# Patient Record
Sex: FEMALE | Race: WHITE | NOT HISPANIC OR LATINO
[De-identification: names, ages, dates, MRNs, and addresses within clinical notes are randomized per-mention and may not be internally consistent; named-entity substitution may affect disease eponyms.]

---

## 2016-12-30 NOTE — H&P ADULT. - ASSESSMENT
Pt is a 80yo F with PMH of alopecia, htn, hLD, migraines, osteoporosis, neuropathy, DVT on xarelto who was BIBEMS for altered mental status, found to have delirium 2/2 sepsis vs iatrogenic encephalopathy vs CVA.

## 2016-12-30 NOTE — H&P ADULT. - PSH
Diverticulosis of sigmoid colon  resection 2004  H/O tonsillitis  tonsillectomy as a child  H/O ventral hernia repair

## 2016-12-30 NOTE — ED PROVIDER NOTE - PMH
Alopecia    Diverticulosis    HTN (hypertension)    Hyperlipidemia    Idiopathic peripheral neuropathy    Migraine    Osteoporosis

## 2016-12-30 NOTE — H&P ADULT. - PROBLEM SELECTOR PLAN 1
- CTH did not find evidence of acute intracranial infarct or hemorrhage  - pt likely has delirium 2/2 sepsis  - monitor VS q4h  - c/w IVNS at 125cc/hr x 10 hours  - no evidence of UTI based on UA, pt has not had recent episodes of diarrhea/vomiting other GI complaints to suspect abdominal source  - pulmonary sxs of ______ and CXR findings suspicious for PNA thus will cover for CAP   - c/w IV ceftriaxone and azithromycin  - trend CBC - CTH did not find evidence of acute intracranial infarct or hemorrhage  - monitor VS q4h  - c/w IVNS at 125cc/hr x 10 hours  - no evidence of UTI based on UA  - pt denies pulmonary sxs however CXR findings suspicious for PNA thus will cover for CAP   - c/w IV ceftriaxone and azithromycin  - trend CBC

## 2016-12-30 NOTE — H&P ADULT. - RADIOLOGY RESULTS AND INTERPRETATION
CT brain stroke protocol- no acute intracranial hemorrhage, mass effect or midline shift. Moderate to severe microvascular disease noted.     CT A/P with oral/IV contrast found atelectasis. No acute abdominal or pelvic pathology was noted     Xray hip 2 view preliminary read found no urgent/emergent findings. Final report pending.  xray L shoulder 1 view preliminary read found no acute fracture. Final report pending.  CXR preliminary read found L mid lung opacity compatible with atelectasis vs infection. Official read pending.

## 2016-12-30 NOTE — ED ADULT NURSE NOTE - OBJECTIVE STATEMENT
Alert and oriented x 1. pt is disoriented to time and situation. As per daughter in law pt was found laying in vomit and urine. Pt seems confused to her , weak and unlike herself. Pt denies chest pain, shortness of breath, nausea or dizziness Pt states she has abdominal pain 6/10, non radiating. IV 20 g to left AC. Labs drawn. VSS. Pt usually ambulates at home.  Pt is sinus rhythm on cardiac monitor. Will continue to monitor. RN Break Coverage.

## 2016-12-30 NOTE — H&P ADULT. - PROBLEM SELECTOR PLAN 2
- check vitamin D, PTH levels - continue therapy for sepsis  - pt has been on chronic vicodin, serum tox negative, will check Utox  - per neuro, will check EEG in AM, MRI/MRA brain if pt's neuro status declines

## 2016-12-30 NOTE — STROKE CODE NOTE - NIH STROKE SCALE: 1A. LEVEL OF CONSCIOUSNESS, QM
(0) Alert; keenly responsive (1) Not alert; but arousable by minor stimulation to obey, answer, or respond

## 2016-12-30 NOTE — ED PROVIDER NOTE - OBJECTIVE STATEMENT
80 yo F with history of diverticulosis, htn, hyperlipidemia, migraine presenting with AMS. Last known normal 8pm yesterday. Daughter in law found mother on bed laying on  side in her own urine and vomit. PtAAOx2. Pt cant recall events. Pt fell two days ago. Pt is on xarelto for dvt.

## 2016-12-30 NOTE — H&P ADULT. - LAB RESULTS AND INTERPRETATION
CBC notable for leukocytosis to 24.33 with neutrophil predominance. Coags abnormal with INR elevated  1.62, expected since pt on xarelto. CMP shows elevated from serum creatinine at 1.25. Total calcium elevated at 11.2. Troponin negative x1. Lactate elevated at 6.  UA positive for blood, protein.  Serum tox negative.

## 2016-12-30 NOTE — H&P ADULT. - MENTAL STATUS
A&O x 2 (name, year) , not place  CN II-XII intact, 5/5 BUE and 5/5 BLE strength, full sensation to light touch  word finding difficulty, can respond to yes and no questions but cannot respond to complex questions

## 2016-12-30 NOTE — ED ADULT TRIAGE NOTE - CHIEF COMPLAINT QUOTE
pt brought from home by EMS s/p call from daughter, as per daughter pt episode of aphasia associated w/ AMS, and emesis approx 1 hour ago, EMS received call 2:40pm, on presentation pt unsure about situation, alert to place and self, daughter also states fall 2 days ago on xarelto, no ecchymosis or hematoma noted on presentation, MD Duran called to triage evelyn mason called pt taken directly to CT

## 2016-12-30 NOTE — ED PROVIDER NOTE - ATTENDING CONTRIBUTION TO CARE
82 yo F with history of htn, hyperlipidemia, migraine presenting with AMS. Last known normal 8pm yesterday. Daughter in law found mother on bed laying on  side in her own urine and vomit. PtAAOx2. Pt cant recall events. Pt fell two days ago. Pt is on xarelto for dvt afvss mod dsitress altered confused rrr cta abd sfot no LE swelling//will ct head and do sepsis and ams work up

## 2016-12-31 NOTE — PHYSICAL THERAPY INITIAL EVALUATION ADULT - RANGE OF MOTION EXAMINATION, REHAB EVAL
bilateral upper extremity ROM was WFL (within functional limits)/bilateral lower extremity ROM was WFL (within functional limits)

## 2016-12-31 NOTE — PHYSICAL THERAPY INITIAL EVALUATION ADULT - PERTINENT HX OF CURRENT PROBLEM, REHAB EVAL
Pt is a 80yo F with PMH of alopecia, htn, hLD, migraines, osteoporosis, neuropathy, DVT on xarelto who was BIBEMS for altered mental status. At baseline, pt ambulates with cane, A&O x3, completes ADLs independently.

## 2017-01-03 NOTE — EEG REPORT - NS EEG TEXT BOX
Date: 1-2 to 1-3-17     Indication: Concern for seizure     FINDINGS:   Awake Background:  -The background was continuous, spontaneously variable, and reactive. During wakefulness, the posteriorly dominant alpha rhythm consisted of symmetric, well modulated 7.5 -8 Hz activity, with an amplitude to 30 uV, that attenuated to eye opening. Low amplitude central beta was noted intermittently.    Background Slowing:  -Generalized irregular low amplitude theta activity was present.      Sleep Background:  -Drowsiness was characterized by fragmentation, attenuation, and slowing of the background activity.   Intermittent increased centro-temporal theta was noted.  -Sleep was characterized by the presence of vertex waves, symmetric spindles, and K-complexes.    Interictal Epileptiform Activity:   -No epileptiform discharges were present.    Events:  -No clinical events or seizures were recorded during this study.    Activation Procedures:   -No activation procedures were performed during this study.    Artifacts:  Intermittent myogenic and movement artifact noted.     Heart rate:  Regular predominantly between   BPM.    Daily Compressed Spectral Array Digital Analysis    FINDINGS: Compressed Spectral Array (CSA) data was reviewed and correlated with the electroencephalographic findings detailed above. CSA showed a variable spectral pattern. Areas of increased power in particular were reviewed in detail, and compared with the raw EEG data. Areas of abrupt increases in spectral power were reviewed to exclude seizures, and were determined to be artifactual in nature.     The relative ratio of the power of delta range frequencies and faster frequencies remained stable over the course of the study. There was no definitive increase in the relative power in the delta frequency spectrum apparent in the left hemisphere versus the right hemisphere.     Compressed Spectral Array (Digital Analysis) Summary/ Impression:  No persistent hemispheric asymmetry. Intermittent areas of increased power reviewed, without definite epileptiform activity associated on CSA.     EEG Summary/Classification:  -Abnormal prolonged video EEG due to:  Mild generalized slowing     EEG Impression/Clinical Correlate:  -Findings indicate mild diffuse or multifocal cerebral dysfunction.  No seizures were recorded.

## 2017-01-03 NOTE — PROVIDER CONTACT NOTE (OTHER) - ASSESSMENT
pt in bed, alert, in no acute distress
All other vitals stable. Pt denies pain. Pt has hx HTN.
bp 172/89

## 2017-01-05 ENCOUNTER — TRANSCRIPTION ENCOUNTER (OUTPATIENT)
Age: 82
End: 2017-01-05

## 2017-01-05 NOTE — DISCHARGE NOTE ADULT - CARE PLAN
Principal Discharge DX:	Pneumonia  Goal:	continue abx  Instructions for follow-up, activity and diet:	Complete antibiotic, Ceftin 500mg po 2x a day. Complete course. Follow up with your PCP for further evaluation and management. Please call to make an appointment.  Secondary Diagnosis:	Idiopathic peripheral neuropathy  Instructions for follow-up, activity and diet:	Follow up with your PCP and pain management doctor for further evaluation and management.  Secondary Diagnosis:	HTN (hypertension)  Instructions for follow-up, activity and diet:	Stop your combo BP medication from home. Start the new medication, just losartan. Follow up with your PCP for further evaluation and management. Please call to make an appointment.  Secondary Diagnosis:	DVT (deep venous thrombosis)  Instructions for follow-up, activity and diet:	continue Xarelto. Follow up with your PCP for further evaluation and management. Please call to make an appointment.  Secondary Diagnosis:	Hypercalcemia  Instructions for follow-up, activity and diet:	Follow up with Dr. Garcia for outpatient follow up.  Secondary Diagnosis:	Hyperlipidemia  Instructions for follow-up, activity and diet:	Continue medications. Follow up with your PCP for further evaluation and management. Please call to make an appointment. Principal Discharge DX:	Pneumonia  Goal:	continue abx  Instructions for follow-up, activity and diet:	Complete antibiotic, Ceftin 500mg po 2x a day. Complete course. Follow up with your PCP for further evaluation and management. Please call to make an appointment.  Secondary Diagnosis:	Idiopathic peripheral neuropathy  Instructions for follow-up, activity and diet:	Follow up with your PCP and pain management doctor for further evaluation and management.  Secondary Diagnosis:	HTN (hypertension)  Instructions for follow-up, activity and diet:	Stop your combo BP medication from home. Start the new medication, just losartan. Follow up with your PCP for further evaluation and management. Please call to make an appointment.  Secondary Diagnosis:	DVT (deep venous thrombosis)  Instructions for follow-up, activity and diet:	continue Xarelto. Follow up with your PCP for further evaluation and management. Please call to make an appointment.  Secondary Diagnosis:	Hypercalcemia  Instructions for follow-up, activity and diet:	Follow up with Dr. Garcia for outpatient follow up.  Secondary Diagnosis:	Hyperlipidemia  Instructions for follow-up, activity and diet:	Continue medications. Follow up with your PCP for further evaluation and management. Please call to make an appointment.  Secondary Diagnosis:	Metabolic encephalopathy  Instructions for follow-up, activity and diet:	EEG was done; no seizure like activity noted. Follow up with Dr. Kunz in 1 week for further evaluation and management. PLease call to make an appointment. Principal Discharge DX:	Pneumonia  Goal:	continue abx  Instructions for follow-up, activity and diet:	Complete antibiotic, Ceftin 500mg po 2x a day. Complete course. Follow up with your PCP for further evaluation and management. Please call to make an appointment.  Secondary Diagnosis:	Idiopathic peripheral neuropathy  Instructions for follow-up, activity and diet:	Follow up with your PCP and pain management doctor for further evaluation and management.  Secondary Diagnosis:	HTN (hypertension)  Instructions for follow-up, activity and diet:	Stop your combo BP medication from home. Start the new medication, just losartan. Follow up with your PCP for further evaluation and management. Please call to make an appointment.  Secondary Diagnosis:	DVT (deep venous thrombosis)  Instructions for follow-up, activity and diet:	continue Xarelto. Follow up with your PCP for further evaluation and management. Please call to make an appointment.  Secondary Diagnosis:	Hypercalcemia  Instructions for follow-up, activity and diet:	Follow up with Dr. Garcia for outpatient follow up.  Secondary Diagnosis:	Hyperlipidemia  Instructions for follow-up, activity and diet:	Continue medications. Follow up with your PCP for further evaluation and management. Please call to make an appointment.  Secondary Diagnosis:	Metabolic encephalopathy  Instructions for follow-up, activity and diet:	EEG was done; no seizure like activity noted. Follow up with Dr. Kuzn in 1 week for further evaluation and management. PLease call to make an appointment.

## 2017-01-05 NOTE — DISCHARGE NOTE ADULT - SECONDARY DIAGNOSIS.
Idiopathic peripheral neuropathy HTN (hypertension) DVT (deep venous thrombosis) Hypercalcemia Hyperlipidemia Metabolic encephalopathy

## 2017-01-05 NOTE — DISCHARGE NOTE ADULT - PLAN OF CARE
continue abx Complete antibiotic, Ceftin 500mg po 2x a day. Complete course. Follow up with your PCP for further evaluation and management. Please call to make an appointment. Follow up with your PCP and pain management doctor for further evaluation and management. Stop your combo BP medication from home. Start the new medication, just losartan. Follow up with your PCP for further evaluation and management. Please call to make an appointment. continue Xarelto. Follow up with your PCP for further evaluation and management. Please call to make an appointment. Follow up with Dr. Garcia for outpatient follow up. Continue medications. Follow up with your PCP for further evaluation and management. Please call to make an appointment. EEG was done; no seizure like activity noted. Follow up with Dr. Kunz in 1 week for further evaluation and management. PLease call to make an appointment.

## 2017-01-05 NOTE — DISCHARGE NOTE ADULT - PATIENT PORTAL LINK FT
“You can access the [facility] Patient Portal, offered by [facility], by registering with the following website: [email or web address]”

## 2017-01-05 NOTE — DISCHARGE NOTE ADULT - PROVIDER TOKENS
TOKEN:'[id number]:'[id number]' TOKEN:'[id number]:'[id number]:'[id number]'

## 2017-01-05 NOTE — DISCHARGE NOTE ADULT - CARE PROVIDERS DIRECT ADDRESSES
,[email or web address],DirectAddress_Unknown,DirectAddress_Unknown [email or web address],DirectAddress_[email or web address],DirectAddress_Unknown

## 2017-01-05 NOTE — DISCHARGE NOTE ADULT - MEDICATION SUMMARY - MEDICATIONS TO TAKE
I will START or STAY ON the medications listed below when I get home from the hospital:    Imitrex 100 mg oral tablet  -- 1 tab(s) by mouth once, As Needed  -- Indication: For Headache    acetaminophen 325 mg oral tablet  -- 2 tab(s) by mouth every 6 hours, As needed, Mild Pain (1 - 3)  -- Indication: For Pain    losartan 25 mg oral tablet  -- 1 tab(s) by mouth once a day  -- Indication: For HTN (hypertension)    rivaroxaban 20 mg oral tablet  -- 1 tab(s) by mouth once a day  -- Indication: For DVT (deep venous thrombosis)    DULoxetine 60 mg oral delayed release capsule  -- 1 cap(s) by mouth 2 times a day  -- Indication: For Depression    simvastatin 20 mg oral tablet  -- 1 tab(s) by mouth once a day (at bedtime)  -- Indication: For Hyperlipidemia    Ceftin 500 mg oral tablet  -- 1 tab(s) by mouth 2 times a day  -- Finish all this medication unless otherwise directed by prescriber.  Medication should be taken with plenty of water.  Take with food or milk.    -- Indication: For Pneumonia    senna oral tablet  -- 2 tab(s) by mouth once a day (at bedtime), As needed, Constipation  -- Indication: For Need for prophylactic measure    lactobacillus acidophilus oral capsule  -- 1 cap(s) by mouth 2 times a day (with meals)  -- Indication: For Need for prophylactic measure I will START or STAY ON the medications listed below when I get home from the hospital:    Imitrex 100 mg oral tablet  -- 1 tab(s) by mouth once, As Needed  -- Indication: For Headache    acetaminophen 325 mg oral tablet  -- 2 tab(s) by mouth every 6 hours, As needed, Mild Pain (1 - 3)  -- Indication: For Pain    losartan 25 mg oral tablet  -- 1 tab(s) by mouth once a day  -- Indication: For  blood pressure    rivaroxaban 20 mg oral tablet  -- 1 tab(s) by mouth once a day  -- Indication: For DVT (deep venous thrombosis)    DULoxetine 60 mg oral delayed release capsule  -- 1 cap(s) by mouth 2 times a day  -- Indication: For Depression    simvastatin 20 mg oral tablet  -- 1 tab(s) by mouth once a day (at bedtime)  -- Indication: For Hyperlipidemia    Ceftin 500 mg oral tablet  -- 1 tab(s) by mouth 2 times a day  -- Finish all this medication unless otherwise directed by prescriber.  Medication should be taken with plenty of water.  Take with food or milk.    -- Indication: For Pneumonia    senna oral tablet  -- 2 tab(s) by mouth once a day (at bedtime), As needed, Constipation  -- Indication: For constipation    lactobacillus acidophilus oral capsule  -- 1 cap(s) by mouth 2 times a day (with meals)  -- Indication: For Need for prophylactic measure    pantoprazole 40 mg oral delayed release tablet  -- 1 tab(s) by mouth once a day  -- It is very important that you take or use this exactly as directed.  Do not skip doses or discontinue unless directed by your doctor.  Obtain medical advice before taking any non-prescription drugs as some may affect the action of this medication.  Swallow whole.  Do not crush.    -- Indication: For Need for prophylactic measure

## 2017-01-05 NOTE — DISCHARGE NOTE ADULT - HOSPITAL COURSE
Pt is a 80yo F with PMH of alopecia, htn, hLD, migraines, osteoporosis, neuropathy, DVT on xarelto who was BIBEMS for altered mental status, found to have delirium 2/2 sepsis vs iatrogenic encephalopathy vs CVA    .Strep pneumo bacteremia - ID consulted ([place]) - rocephin switched to Ceftin po for discharge. Course to be completed 1/14/17      Metabolic encephalopathy  - CT head neg   - continue therapy for sepsis  - pt has been on chronic vicodin, tox positive for opioids  - per neuro, EEG- -Abnormal prolonged video EEG due to: Mild generalized slowing   -Findings indicate mild diffuse or multifocal cerebral dysfunction.  No seizures were recorded.     DVT  - continue with xarelto at home dose.     Hyperparathyroidism  - Endo following (Garcia)  - outpt scan      HTN - hold losartan-hctz due to MYLES ; losartan resumed after MYLES improved. HCTZ remains held    Hyperlipidemia.- c/w simvastatin.    Patient to follow up with PCP and Endo as outpatient Physical therapy recommended home with home PT services Pt is a 82yo F with PMH of alopecia, htn, hLD, migraines, osteoporosis, neuropathy, DVT on xarelto who was BIBEMS for altered mental status, found to have delirium 2/2 sepsis vs iatrogenic encephalopathy vs CVA    .Strep pneumo bacteremia - ID consulted ([place]) - rocephin switched to Ceftin po for discharge. Course to be completed 1/14/17      Metabolic encephalopathy  - CT head neg   - continue therapy for sepsis  - pt has been on chronic vicodin, tox positive for opioids  - per neuro, EEG- -Abnormal prolonged video EEG due to: Mild generalized slowing   -Findings indicate mild diffuse or multifocal cerebral dysfunction.  No seizures were recorded. patient to follow up with Dr. Kunz in 1 week of discharge.     DVT  - continue with xarelto at home dose.     Hyperparathyroidism  - Endo following (Garcia)  - outpt scan      HTN - hold losartan-hctz due to MYLES ; losartan resumed after MYLES improved. HCTZ remains held    Hyperlipidemia.- c/w simvastatin.    Patient to follow up with PCP and Endo as outpatient Physical therapy recommended home with home PT services

## 2017-01-05 NOTE — DISCHARGE NOTE ADULT - MEDICATION SUMMARY - MEDICATIONS TO STOP TAKING
I will STOP taking the medications listed below when I get home from the hospital:    Vicodin 5 mg-300 mg oral tablet  -- 1 tab(s) by mouth every 8 hours    losartan-hydroCHLOROthiazide 50mg-12.5mg oral tablet  -- 1 tab(s) by mouth once a day

## 2018-01-17 NOTE — H&P ADULT. - REASON FOR ADMISSION
Message  Discussed with patient about side effects of Neurontin, patient is agreeable and patient would speak to family physician prior to taking the medication      Plan  Lumbar radiculopathy    · Gabapentin 100 MG Oral Capsule (Neurontin); TAKE 1 CAPSULE AT BEDTIME    Signatures   Electronically signed by : Monica Cortes MD; Oct  4 2016  3:49PM EST                       (Author) altered mental status

## 2018-10-18 ENCOUNTER — APPOINTMENT (OUTPATIENT)
Dept: FAMILY MEDICINE | Facility: CLINIC | Age: 83
End: 2018-10-18
Payer: MEDICARE

## 2018-10-18 ENCOUNTER — NON-APPOINTMENT (OUTPATIENT)
Age: 83
End: 2018-10-18

## 2018-10-18 VITALS
HEART RATE: 81 BPM | HEIGHT: 62 IN | BODY MASS INDEX: 37.91 KG/M2 | DIASTOLIC BLOOD PRESSURE: 88 MMHG | OXYGEN SATURATION: 97 % | WEIGHT: 206 LBS | TEMPERATURE: 97.8 F | SYSTOLIC BLOOD PRESSURE: 126 MMHG

## 2018-10-18 DIAGNOSIS — Z23 ENCOUNTER FOR IMMUNIZATION: ICD-10-CM

## 2018-10-18 DIAGNOSIS — Z00.00 ENCOUNTER FOR GENERAL ADULT MEDICAL EXAMINATION W/OUT ABNORMAL FINDINGS: ICD-10-CM

## 2018-10-18 DIAGNOSIS — Z87.19 PERSONAL HISTORY OF OTHER DISEASES OF THE DIGESTIVE SYSTEM: ICD-10-CM

## 2018-10-18 DIAGNOSIS — Z13.1 ENCOUNTER FOR SCREENING FOR DIABETES MELLITUS: ICD-10-CM

## 2018-10-18 DIAGNOSIS — Z81.8 FAMILY HISTORY OF OTHER MENTAL AND BEHAVIORAL DISORDERS: ICD-10-CM

## 2018-10-18 DIAGNOSIS — Z82.3 FAMILY HISTORY OF STROKE: ICD-10-CM

## 2018-10-18 DIAGNOSIS — Z82.49 FAMILY HISTORY OF ISCHEMIC HEART DISEASE AND OTHER DISEASES OF THE CIRCULATORY SYSTEM: ICD-10-CM

## 2018-10-18 DIAGNOSIS — Z13.29 ENCOUNTER FOR SCREENING FOR OTHER SUSPECTED ENDOCRINE DISORDER: ICD-10-CM

## 2018-10-18 DIAGNOSIS — G43.709 CHRONIC MIGRAINE W/OUT AURA, NOT INTRACTABLE, W/OUT STATUS MIGRAINOSUS: ICD-10-CM

## 2018-10-18 DIAGNOSIS — Z13.21 ENCOUNTER FOR SCREENING FOR NUTRITIONAL DISORDER: ICD-10-CM

## 2018-10-18 DIAGNOSIS — Z86.718 PERSONAL HISTORY OF OTHER VENOUS THROMBOSIS AND EMBOLISM: ICD-10-CM

## 2018-10-18 DIAGNOSIS — Z13.220 ENCOUNTER FOR SCREENING FOR LIPOID DISORDERS: ICD-10-CM

## 2018-10-18 DIAGNOSIS — Z87.891 PERSONAL HISTORY OF NICOTINE DEPENDENCE: ICD-10-CM

## 2018-10-18 PROCEDURE — 99214 OFFICE O/P EST MOD 30 MIN: CPT | Mod: 25

## 2018-10-18 PROCEDURE — 93000 ELECTROCARDIOGRAM COMPLETE: CPT

## 2018-10-18 PROCEDURE — 90662 IIV NO PRSV INCREASED AG IM: CPT

## 2018-10-18 PROCEDURE — G0008: CPT

## 2018-10-18 PROCEDURE — G0439: CPT

## 2018-10-18 PROCEDURE — 36415 COLL VENOUS BLD VENIPUNCTURE: CPT

## 2018-10-18 RX ORDER — SUMATRIPTAN 100 MG/1
100 TABLET, FILM COATED ORAL
Refills: 0 | Status: ACTIVE | COMMUNITY

## 2018-10-19 ENCOUNTER — OTHER (OUTPATIENT)
Age: 83
End: 2018-10-19

## 2018-10-19 LAB
25(OH)D3 SERPL-MCNC: 13.1 NG/ML
ALBUMIN SERPL ELPH-MCNC: 4.7 G/DL
ALP BLD-CCNC: 90 U/L
ALT SERPL-CCNC: 15 U/L
ANION GAP SERPL CALC-SCNC: 14 MMOL/L
AST SERPL-CCNC: 20 U/L
BASOPHILS # BLD AUTO: 0.03 K/UL
BASOPHILS # BLD AUTO: 0.05 K/UL
BASOPHILS NFR BLD AUTO: 0.4 %
BASOPHILS NFR BLD AUTO: 0.8 %
BILIRUB SERPL-MCNC: 0.4 MG/DL
BUN SERPL-MCNC: 17 MG/DL
CALCIUM SERPL-MCNC: 10.4 MG/DL
CHLORIDE SERPL-SCNC: 106 MMOL/L
CHOLEST SERPL-MCNC: 172 MG/DL
CHOLEST/HDLC SERPL: 2.7 RATIO
CO2 SERPL-SCNC: 22 MMOL/L
CREAT SERPL-MCNC: 0.64 MG/DL
DSDNA AB SER-ACNC: <12 IU/ML
ENA RNP AB SER IA-ACNC: <0.2 AL
ENA SM AB SER IA-ACNC: <0.2 AL
ENA SS-A AB SER IA-ACNC: <0.2 AL
ENA SS-B AB SER IA-ACNC: <0.2 AL
EOSINOPHIL # BLD AUTO: 0.19 K/UL
EOSINOPHIL # BLD AUTO: 0.21 K/UL
EOSINOPHIL NFR BLD AUTO: 3.1 %
EOSINOPHIL NFR BLD AUTO: 3.1 %
ERYTHROCYTE [SEDIMENTATION RATE] IN BLOOD BY WESTERGREN METHOD: 34 MM/HR
FERRITIN SERPL-MCNC: 33 NG/ML
FOLATE SERPL-MCNC: >20 NG/ML
GLUCOSE SERPL-MCNC: 105 MG/DL
HBA1C MFR BLD HPLC: 5.9 %
HCT VFR BLD CALC: 39.9 %
HCT VFR BLD CALC: 40.4 %
HDLC SERPL-MCNC: 64 MG/DL
HGB BLD-MCNC: 12.9 G/DL
HGB BLD-MCNC: 13.1 G/DL
IMM GRANULOCYTES NFR BLD AUTO: 0.1 %
IMM GRANULOCYTES NFR BLD AUTO: 0.2 %
IRON SATN MFR SERPL: 27 %
IRON SERPL-MCNC: 98 UG/DL
LDH SERPL-CCNC: 146 U/L
LDLC SERPL CALC-MCNC: 73 MG/DL
LYMPHOCYTES # BLD AUTO: 2.42 K/UL
LYMPHOCYTES # BLD AUTO: 2.68 K/UL
LYMPHOCYTES NFR BLD AUTO: 39.2 %
LYMPHOCYTES NFR BLD AUTO: 39.4 %
MAN DIFF?: NORMAL
MAN DIFF?: NORMAL
MCHC RBC-ENTMCNC: 29 PG
MCHC RBC-ENTMCNC: 29.2 PG
MCHC RBC-ENTMCNC: 31.9 GM/DL
MCHC RBC-ENTMCNC: 32.8 GM/DL
MCV RBC AUTO: 88.9 FL
MCV RBC AUTO: 90.8 FL
MONOCYTES # BLD AUTO: 0.53 K/UL
MONOCYTES # BLD AUTO: 0.58 K/UL
MONOCYTES NFR BLD AUTO: 8.5 %
MONOCYTES NFR BLD AUTO: 8.6 %
NEUTROPHILS # BLD AUTO: 2.97 K/UL
NEUTROPHILS # BLD AUTO: 3.3 K/UL
NEUTROPHILS NFR BLD AUTO: 48.1 %
NEUTROPHILS NFR BLD AUTO: 48.5 %
PLATELET # BLD AUTO: 331 K/UL
PLATELET # BLD AUTO: 340 K/UL
POTASSIUM SERPL-SCNC: 3.9 MMOL/L
PROT SERPL-MCNC: 7.3 G/DL
RBC # BLD: 4.45 M/UL
RBC # BLD: 4.49 M/UL
RBC # FLD: 15.4 %
RBC # FLD: 15.6 %
RHEUMATOID FACT SER QL: <10 IU/ML
SODIUM SERPL-SCNC: 142 MMOL/L
T PALLIDUM AB SER QL IA: NEGATIVE
TIBC SERPL-MCNC: 364 UG/DL
TRIGL SERPL-MCNC: 175 MG/DL
TSH SERPL-ACNC: 2.9 UIU/ML
UIBC SERPL-MCNC: 266 UG/DL
VIT B12 SERPL-MCNC: 315 PG/ML
WBC # FLD AUTO: 6.17 K/UL
WBC # FLD AUTO: 6.81 K/UL

## 2018-10-29 ENCOUNTER — OTHER (OUTPATIENT)
Age: 83
End: 2018-10-29

## 2018-10-29 LAB
ANA SER IF-ACNC: NEGATIVE
B BURGDOR AB SER-IMP: NEGATIVE
B BURGDOR IGM PATRN SER IB-IMP: POSITIVE
B BURGDOR18/20KD IGM SER QL IB: NORMAL
B BURGDOR18KD IGG SER QL IB: NORMAL
B BURGDOR23KD IGG SER QL IB: NORMAL
B BURGDOR23KD IGM SER QL IB: NORMAL
B BURGDOR28KD AB SER QL IB: NORMAL
B BURGDOR28KD IGG SER QL IB: NORMAL
B BURGDOR30KD AB SER QL IB: NORMAL
B BURGDOR30KD IGG SER QL IB: NORMAL
B BURGDOR31KD IGG SER QL IB: NORMAL
B BURGDOR31KD IGM SER QL IB: NORMAL
B BURGDOR39KD IGG SER QL IB: NORMAL
B BURGDOR39KD IGM SER QL IB: PRESENT
B BURGDOR41KD IGG SER QL IB: PRESENT
B BURGDOR41KD IGM SER QL IB: PRESENT
B BURGDOR45KD AB SER QL IB: NORMAL
B BURGDOR45KD IGG SER QL IB: NORMAL
B BURGDOR58KD AB SER QL IB: NORMAL
B BURGDOR58KD IGG SER QL IB: PRESENT
B BURGDOR66KD IGG SER QL IB: PRESENT
B BURGDOR66KD IGM SER QL IB: NORMAL
B BURGDOR93KD IGG SER QL IB: NORMAL
B BURGDOR93KD IGM SER QL IB: NORMAL

## 2018-10-30 ENCOUNTER — APPOINTMENT (OUTPATIENT)
Dept: FAMILY MEDICINE | Facility: CLINIC | Age: 83
End: 2018-10-30

## 2018-11-16 ENCOUNTER — APPOINTMENT (OUTPATIENT)
Dept: FAMILY MEDICINE | Facility: CLINIC | Age: 83
End: 2018-11-16

## 2018-11-29 ENCOUNTER — APPOINTMENT (OUTPATIENT)
Dept: FAMILY MEDICINE | Facility: CLINIC | Age: 83
End: 2018-11-29
Payer: MEDICARE

## 2018-11-29 VITALS — SYSTOLIC BLOOD PRESSURE: 136 MMHG | DIASTOLIC BLOOD PRESSURE: 82 MMHG

## 2018-11-29 VITALS
DIASTOLIC BLOOD PRESSURE: 90 MMHG | OXYGEN SATURATION: 97 % | HEART RATE: 73 BPM | BODY MASS INDEX: 37.91 KG/M2 | WEIGHT: 206 LBS | HEIGHT: 62 IN | SYSTOLIC BLOOD PRESSURE: 140 MMHG

## 2018-11-29 DIAGNOSIS — M25.50 PAIN IN UNSPECIFIED JOINT: ICD-10-CM

## 2018-11-29 DIAGNOSIS — M79.605 PAIN IN RIGHT LEG: ICD-10-CM

## 2018-11-29 DIAGNOSIS — M79.604 PAIN IN RIGHT LEG: ICD-10-CM

## 2018-11-29 DIAGNOSIS — R51 HEADACHE: ICD-10-CM

## 2018-11-29 PROCEDURE — 36415 COLL VENOUS BLD VENIPUNCTURE: CPT

## 2018-11-29 PROCEDURE — 99214 OFFICE O/P EST MOD 30 MIN: CPT | Mod: 25

## 2018-12-04 ENCOUNTER — OTHER (OUTPATIENT)
Age: 83
End: 2018-12-04

## 2018-12-04 LAB
ALBUMIN SERPL ELPH-MCNC: 4.5 G/DL
ALP BLD-CCNC: 87 U/L
ALT SERPL-CCNC: 12 U/L
ANION GAP SERPL CALC-SCNC: 14 MMOL/L
AST SERPL-CCNC: 19 U/L
B BURGDOR AB SER-IMP: NEGATIVE
B BURGDOR IGM PATRN SER IB-IMP: POSITIVE
B BURGDOR18/20KD IGM SER QL IB: NORMAL
B BURGDOR18KD IGG SER QL IB: NORMAL
B BURGDOR23KD IGG SER QL IB: NORMAL
B BURGDOR23KD IGM SER QL IB: NORMAL
B BURGDOR28KD AB SER QL IB: NORMAL
B BURGDOR28KD IGG SER QL IB: NORMAL
B BURGDOR30KD AB SER QL IB: NORMAL
B BURGDOR30KD IGG SER QL IB: NORMAL
B BURGDOR31KD IGG SER QL IB: NORMAL
B BURGDOR31KD IGM SER QL IB: NORMAL
B BURGDOR39KD IGG SER QL IB: NORMAL
B BURGDOR39KD IGM SER QL IB: PRESENT
B BURGDOR41KD IGG SER QL IB: NORMAL
B BURGDOR41KD IGM SER QL IB: PRESENT
B BURGDOR45KD AB SER QL IB: NORMAL
B BURGDOR45KD IGG SER QL IB: PRESENT
B BURGDOR58KD AB SER QL IB: NORMAL
B BURGDOR58KD IGG SER QL IB: NORMAL
B BURGDOR66KD IGG SER QL IB: NORMAL
B BURGDOR66KD IGM SER QL IB: NORMAL
B BURGDOR93KD IGG SER QL IB: NORMAL
B BURGDOR93KD IGM SER QL IB: NORMAL
BASOPHILS # BLD AUTO: 0.04 K/UL
BASOPHILS NFR BLD AUTO: 0.5 %
BILIRUB SERPL-MCNC: 0.3 MG/DL
BUN SERPL-MCNC: 18 MG/DL
CALCIUM SERPL-MCNC: 10.9 MG/DL
CHLORIDE SERPL-SCNC: 104 MMOL/L
CO2 SERPL-SCNC: 24 MMOL/L
CREAT SERPL-MCNC: 0.61 MG/DL
EOSINOPHIL # BLD AUTO: 0.24 K/UL
EOSINOPHIL NFR BLD AUTO: 3.3 %
FERRITIN SERPL-MCNC: 38 NG/ML
FOLATE SERPL-MCNC: >20 NG/ML
GLUCOSE SERPL-MCNC: 83 MG/DL
HCT VFR BLD CALC: 39.7 %
HGB BLD-MCNC: 12.5 G/DL
IMM GRANULOCYTES NFR BLD AUTO: 0.1 %
IRON SATN MFR SERPL: 19 %
IRON SERPL-MCNC: 62 UG/DL
LYMPHOCYTES # BLD AUTO: 3.4 K/UL
LYMPHOCYTES NFR BLD AUTO: 46.3 %
MAN DIFF?: NORMAL
MCHC RBC-ENTMCNC: 29.1 PG
MCHC RBC-ENTMCNC: 31.5 GM/DL
MCV RBC AUTO: 92.3 FL
MONOCYTES # BLD AUTO: 0.73 K/UL
MONOCYTES NFR BLD AUTO: 9.9 %
NEUTROPHILS # BLD AUTO: 2.93 K/UL
NEUTROPHILS NFR BLD AUTO: 39.9 %
PLATELET # BLD AUTO: 367 K/UL
POTASSIUM SERPL-SCNC: 3.8 MMOL/L
PROT SERPL-MCNC: 7.1 G/DL
RBC # BLD: 4.3 M/UL
RBC # FLD: 15.1 %
SODIUM SERPL-SCNC: 142 MMOL/L
TIBC SERPL-MCNC: 329 UG/DL
UIBC SERPL-MCNC: 267 UG/DL
VIT B12 SERPL-MCNC: 358 PG/ML
WBC # FLD AUTO: 7.35 K/UL

## 2018-12-05 ENCOUNTER — MEDICATION RENEWAL (OUTPATIENT)
Age: 83
End: 2018-12-05

## 2018-12-06 ENCOUNTER — APPOINTMENT (OUTPATIENT)
Dept: INTERNAL MEDICINE | Facility: CLINIC | Age: 83
End: 2018-12-06

## 2019-01-10 ENCOUNTER — OTHER (OUTPATIENT)
Age: 84
End: 2019-01-10

## 2019-01-10 DIAGNOSIS — F32.9 MAJOR DEPRESSIVE DISORDER, SINGLE EPISODE, UNSPECIFIED: ICD-10-CM

## 2019-01-17 ENCOUNTER — APPOINTMENT (OUTPATIENT)
Dept: NEUROLOGY | Facility: CLINIC | Age: 84
End: 2019-01-17
Payer: MEDICARE

## 2019-01-17 VITALS
DIASTOLIC BLOOD PRESSURE: 78 MMHG | HEIGHT: 62 IN | WEIGHT: 205 LBS | BODY MASS INDEX: 37.73 KG/M2 | SYSTOLIC BLOOD PRESSURE: 130 MMHG

## 2019-01-17 DIAGNOSIS — G62.9 POLYNEUROPATHY, UNSPECIFIED: ICD-10-CM

## 2019-01-17 DIAGNOSIS — G44.89 OTHER HEADACHE SYNDROME: ICD-10-CM

## 2019-01-17 DIAGNOSIS — Z87.19 PERSONAL HISTORY OF OTHER DISEASES OF THE DIGESTIVE SYSTEM: ICD-10-CM

## 2019-01-17 PROCEDURE — 99214 OFFICE O/P EST MOD 30 MIN: CPT

## 2019-01-17 RX ORDER — DOXYCYCLINE HYCLATE 100 MG/1
100 CAPSULE ORAL TWICE DAILY
Qty: 56 | Refills: 0 | Status: COMPLETED | COMMUNITY
Start: 2018-10-29 | End: 2019-01-17

## 2019-01-17 RX ORDER — INFLUENZA A VIRUS A/MICHIGAN/45/2015 X-275 (H1N1) ANTIGEN (FORMALDEHYDE INACTIVATED), INFLUENZA A VIRUS A/SINGAPORE/INFIMH-16-0019/2016 IVR-186 (H3N2) ANTIGEN (FORMALDEHYDE INACTIVATED), AND INFLUENZA B VIRUS B/MARYLAND/15/2016 BX-69A (A B/COLORADO/6/2017-LIKE VIRUS) ANTIGEN (FORMALDEHYDE INACTIVATED) 60; 60; 60 UG/.5ML; UG/.5ML; UG/.5ML
0.5 INJECTION, SUSPENSION INTRAMUSCULAR
Qty: 1 | Refills: 0 | Status: COMPLETED | COMMUNITY
Start: 2018-10-18 | End: 2019-01-17

## 2019-01-17 RX ORDER — AMOXICILLIN 500 MG/1
500 TABLET, FILM COATED ORAL 3 TIMES DAILY
Qty: 84 | Refills: 0 | Status: COMPLETED | COMMUNITY
Start: 2018-12-04 | End: 2019-01-17

## 2019-01-17 RX ORDER — AMLODIPINE BESYLATE 5 MG/1
5 TABLET ORAL
Refills: 0 | Status: COMPLETED | COMMUNITY
End: 2019-01-17

## 2019-01-17 NOTE — PHYSICAL EXAM
[General Appearance - Alert] : alert [General Appearance - In No Acute Distress] : in no acute distress [Oriented To Time, Place, And Person] : oriented to person, place, and time [Memory Recent] : recent memory was not impaired [Memory Remote] : remote memory was not impaired [Cranial Nerves Optic (II)] : visual acuity intact bilaterally,  visual fields full to confrontation, pupils equal round and reactive to light [Cranial Nerves Oculomotor (III)] : extraocular motion intact [Cranial Nerves Trigeminal (V)] : facial sensation intact symmetrically [Cranial Nerves Facial (VII)] : face symmetrical [Cranial Nerves Glossopharyngeal (IX)] : tongue and palate midline [Cranial Nerves Accessory (XI - Cranial And Spinal)] : head turning and shoulder shrug symmetric [Cranial Nerves Hypoglossal (XII)] : there was no tongue deviation with protrusion [Motor Tone] : muscle tone was normal in all four extremities [Motor Strength] : muscle strength was normal in all four extremities [Sensation Tactile Decrease] : light touch was intact [Sensation Pain / Temperature Decrease] : pain and temperature was intact [Romberg's Sign] : a positive Romberg's sign was present [1+] : Patella left 1+ [0] : Ankle jerk left 0 [Optic Disc Abnormality] : the optic disc were normal in size and color [Involuntary Movements] : no involuntary movements were seen [Dysarthria] : no dysarthria [Aphasia] : no dysphasia/aphasia [Coordination - Dysmetria Impaired Finger-to-Nose Bilateral] : not present [Plantar Reflex Right Only] : normal on the right [Plantar Reflex Left Only] : normal on the left [FreeTextEntry8] : Gait is unsteady and she uses a walker for support.

## 2019-01-17 NOTE — HISTORY OF PRESENT ILLNESS
[FreeTextEntry1] : I saw this patient in the office today.\par \par She describes a history of migraines since she was young. This has subsided.\par She now reports that they occur only occasionally.\par Recently she has been having a different kind of headache.\par These have been occurring several times per week.\par There is no associated nausea or photophobia.\par \par She was sent for an MRI of the brain.\par \par He describes a history of peripheral neuropathy for many years. \par Her balance is poor. \par She has been using a walker for the past year.\par \par

## 2019-01-17 NOTE — DATA REVIEWED
[de-identified] : Brain MRI was performed on 1/11/19 at [facility].\par The study demonstrated chronic ischemic change but no acute pathology.\par  [de-identified] : Sedimentation rate was 34 in October of 2018.

## 2019-01-17 NOTE — ASSESSMENT
[FreeTextEntry1] : This is an 83-year-old woman with a long history of chronic migraine.\par \par Recent brain MRI demonstrates only chronic ischemic findings.\par \par She now reports that the headaches are only bother her occasionally.\par \par She has no neuropathic pain at present. She is currently on Cymbalta.\par I would recommend she continue this.\par \par I do not see need for any further intervention at present.

## 2019-01-17 NOTE — REVIEW OF SYSTEMS
[As Noted in HPI] : as noted in HPI [Loss Of Hearing] : hearing loss [Lower Ext Edema] : lower extremity edema [Heartburn] : heartburn [Joint Pain] : joint pain [Negative] : Heme/Lymph

## 2019-01-30 ENCOUNTER — MEDICATION RENEWAL (OUTPATIENT)
Age: 84
End: 2019-01-30

## 2019-01-31 ENCOUNTER — OTHER (OUTPATIENT)
Age: 84
End: 2019-01-31

## 2019-01-31 RX ORDER — DULOXETINE HYDROCHLORIDE 60 MG/1
60 CAPSULE, DELAYED RELEASE ORAL
Qty: 90 | Refills: 0 | Status: DISCONTINUED | COMMUNITY
End: 2019-01-31

## 2019-02-22 ENCOUNTER — APPOINTMENT (OUTPATIENT)
Dept: FAMILY MEDICINE | Facility: CLINIC | Age: 84
End: 2019-02-22
Payer: MEDICARE

## 2019-02-22 VITALS
OXYGEN SATURATION: 97 % | SYSTOLIC BLOOD PRESSURE: 120 MMHG | HEIGHT: 62 IN | DIASTOLIC BLOOD PRESSURE: 82 MMHG | HEART RATE: 77 BPM | BODY MASS INDEX: 38.64 KG/M2 | WEIGHT: 210 LBS

## 2019-02-22 DIAGNOSIS — E83.52 HYPERCALCEMIA: ICD-10-CM

## 2019-02-22 DIAGNOSIS — A69.20 LYME DISEASE, UNSPECIFIED: ICD-10-CM

## 2019-02-22 DIAGNOSIS — Z86.39 PERSONAL HISTORY OF OTHER ENDOCRINE, NUTRITIONAL AND METABOLIC DISEASE: ICD-10-CM

## 2019-02-22 DIAGNOSIS — R04.0 EPISTAXIS: ICD-10-CM

## 2019-02-22 PROCEDURE — 99214 OFFICE O/P EST MOD 30 MIN: CPT | Mod: 25

## 2019-02-22 PROCEDURE — 36415 COLL VENOUS BLD VENIPUNCTURE: CPT

## 2019-02-22 RX ORDER — PREGABALIN 50 MG/1
50 CAPSULE ORAL DAILY
Qty: 30 | Refills: 3 | Status: DISCONTINUED | COMMUNITY
Start: 2019-01-31 | End: 2019-02-22

## 2019-02-26 LAB
25(OH)D3 SERPL-MCNC: 16.4 NG/ML
ALBUMIN SERPL ELPH-MCNC: 4.2 G/DL
ALP BLD-CCNC: 76 U/L
ALT SERPL-CCNC: 16 U/L
ANION GAP SERPL CALC-SCNC: 15 MMOL/L
APTT BLD: 27.1 SEC
AST SERPL-CCNC: 20 U/L
BASOPHILS # BLD AUTO: 0.08 K/UL
BASOPHILS NFR BLD AUTO: 1 %
BILIRUB SERPL-MCNC: 0.3 MG/DL
BUN SERPL-MCNC: 12 MG/DL
CALCIUM SERPL-MCNC: 10.8 MG/DL
CALCIUM SERPL-MCNC: 10.8 MG/DL
CHLORIDE SERPL-SCNC: 104 MMOL/L
CHOLEST SERPL-MCNC: 142 MG/DL
CHOLEST/HDLC SERPL: 2.8 RATIO
CO2 SERPL-SCNC: 23 MMOL/L
CREAT SERPL-MCNC: 0.65 MG/DL
EOSINOPHIL # BLD AUTO: 0.19 K/UL
EOSINOPHIL NFR BLD AUTO: 2.4 %
ERYTHROCYTE [SEDIMENTATION RATE] IN BLOOD BY WESTERGREN METHOD: 32 MM/HR
FERRITIN SERPL-MCNC: 40 NG/ML
FOLATE SERPL-MCNC: >20 NG/ML
GLUCOSE SERPL-MCNC: 99 MG/DL
HBA1C MFR BLD HPLC: 5.8 %
HCT VFR BLD CALC: 37.8 %
HDLC SERPL-MCNC: 50 MG/DL
HGB BLD-MCNC: 11.8 G/DL
IMM GRANULOCYTES NFR BLD AUTO: 0.1 %
INR PPP: 0.97 RATIO
IRON SATN MFR SERPL: 19 %
IRON SERPL-MCNC: 66 UG/DL
LDLC SERPL CALC-MCNC: 63 MG/DL
LYMPHOCYTES # BLD AUTO: 3.57 K/UL
LYMPHOCYTES NFR BLD AUTO: 45.9 %
MAN DIFF?: NORMAL
MCHC RBC-ENTMCNC: 29.1 PG
MCHC RBC-ENTMCNC: 31.2 GM/DL
MCV RBC AUTO: 93.3 FL
MONOCYTES # BLD AUTO: 0.63 K/UL
MONOCYTES NFR BLD AUTO: 8.1 %
NEUTROPHILS # BLD AUTO: 3.29 K/UL
NEUTROPHILS NFR BLD AUTO: 42.5 %
PARATHYROID HORMONE INTACT: 80 PG/ML
PLATELET # BLD AUTO: 354 K/UL
POTASSIUM SERPL-SCNC: 3.5 MMOL/L
PROT SERPL-MCNC: 6.9 G/DL
PT BLD: 11.1 SEC
RBC # BLD: 4.05 M/UL
RBC # FLD: 13.9 %
SODIUM SERPL-SCNC: 142 MMOL/L
TIBC SERPL-MCNC: 340 UG/DL
TRIGL SERPL-MCNC: 143 MG/DL
TSH SERPL-ACNC: 2.12 UIU/ML
UIBC SERPL-MCNC: 274 UG/DL
VIT B12 SERPL-MCNC: 404 PG/ML
WBC # FLD AUTO: 7.77 K/UL

## 2019-03-04 LAB

## 2019-03-14 ENCOUNTER — OTHER (OUTPATIENT)
Age: 84
End: 2019-03-14

## 2019-03-25 ENCOUNTER — APPOINTMENT (OUTPATIENT)
Dept: FAMILY MEDICINE | Facility: CLINIC | Age: 84
End: 2019-03-25
Payer: MEDICARE

## 2019-03-25 ENCOUNTER — NON-APPOINTMENT (OUTPATIENT)
Age: 84
End: 2019-03-25

## 2019-03-25 VITALS
HEIGHT: 62 IN | BODY MASS INDEX: 38.46 KG/M2 | DIASTOLIC BLOOD PRESSURE: 84 MMHG | SYSTOLIC BLOOD PRESSURE: 114 MMHG | HEART RATE: 94 BPM | OXYGEN SATURATION: 96 % | WEIGHT: 209 LBS

## 2019-03-25 DIAGNOSIS — M25.569 PAIN IN UNSPECIFIED KNEE: ICD-10-CM

## 2019-03-25 DIAGNOSIS — Z86.79 PERSONAL HISTORY OF OTHER DISEASES OF THE CIRCULATORY SYSTEM: ICD-10-CM

## 2019-03-25 DIAGNOSIS — Z13.820 ENCOUNTER FOR SCREENING FOR OSTEOPOROSIS: ICD-10-CM

## 2019-03-25 PROCEDURE — 99214 OFFICE O/P EST MOD 30 MIN: CPT | Mod: 25

## 2019-03-25 PROCEDURE — 36415 COLL VENOUS BLD VENIPUNCTURE: CPT

## 2019-03-25 PROCEDURE — 93000 ELECTROCARDIOGRAM COMPLETE: CPT

## 2019-03-26 LAB
ALBUMIN SERPL ELPH-MCNC: 4.4 G/DL
ALP BLD-CCNC: 80 U/L
ALT SERPL-CCNC: 11 U/L
ANION GAP SERPL CALC-SCNC: 12 MMOL/L
APPEARANCE: ABNORMAL
AST SERPL-CCNC: 18 U/L
BACTERIA: ABNORMAL
BILIRUB SERPL-MCNC: 0.4 MG/DL
BILIRUBIN URINE: NEGATIVE
BLOOD URINE: ABNORMAL
BUN SERPL-MCNC: 14 MG/DL
CALCIUM SERPL-MCNC: 10.6 MG/DL
CHLORIDE SERPL-SCNC: 105 MMOL/L
CO2 SERPL-SCNC: 24 MMOL/L
COLOR: YELLOW
CREAT SERPL-MCNC: 0.62 MG/DL
GLUCOSE QUALITATIVE U: NEGATIVE
GLUCOSE SERPL-MCNC: 97 MG/DL
HYALINE CASTS: 0 /LPF
KETONES URINE: NEGATIVE
LEUKOCYTE ESTERASE URINE: NEGATIVE
MICROSCOPIC-UA: NORMAL
NITRITE URINE: NEGATIVE
PH URINE: 6
POTASSIUM SERPL-SCNC: 3.9 MMOL/L
PROT SERPL-MCNC: 6.9 G/DL
PROTEIN URINE: ABNORMAL
RED BLOOD CELLS URINE: 8 /HPF
SODIUM SERPL-SCNC: 141 MMOL/L
SPECIFIC GRAVITY URINE: 1.03
SQUAMOUS EPITHELIAL CELLS: 15 /HPF
UROBILINOGEN URINE: NORMAL
WHITE BLOOD CELLS URINE: 5 /HPF

## 2019-04-04 ENCOUNTER — APPOINTMENT (OUTPATIENT)
Dept: CARDIOLOGY | Facility: CLINIC | Age: 84
End: 2019-04-04
Payer: MEDICARE

## 2019-04-04 ENCOUNTER — NON-APPOINTMENT (OUTPATIENT)
Age: 84
End: 2019-04-04

## 2019-04-04 VITALS
BODY MASS INDEX: 37.73 KG/M2 | DIASTOLIC BLOOD PRESSURE: 86 MMHG | SYSTOLIC BLOOD PRESSURE: 146 MMHG | WEIGHT: 205 LBS | HEART RATE: 78 BPM | HEIGHT: 62 IN | OXYGEN SATURATION: 97 %

## 2019-04-04 DIAGNOSIS — R94.31 ABNORMAL ELECTROCARDIOGRAM [ECG] [EKG]: ICD-10-CM

## 2019-04-04 PROCEDURE — 99204 OFFICE O/P NEW MOD 45 MIN: CPT | Mod: 25

## 2019-04-04 PROCEDURE — 93000 ELECTROCARDIOGRAM COMPLETE: CPT

## 2019-04-04 RX ORDER — ASPIRIN 325 MG/1
325 TABLET, FILM COATED ORAL
Refills: 0 | Status: DISCONTINUED | COMMUNITY
End: 2019-04-04

## 2019-04-04 NOTE — PHYSICAL EXAM
[General Appearance - Well Developed] : well developed [Normal Appearance] : normal appearance [Well Groomed] : well groomed [General Appearance - Well Nourished] : well nourished [No Deformities] : no deformities [General Appearance - In No Acute Distress] : no acute distress [Normal Conjunctiva] : the conjunctiva exhibited no abnormalities [Eyelids - No Xanthelasma] : the eyelids demonstrated no xanthelasmas [Normal Oral Mucosa] : normal oral mucosa [No Oral Pallor] : no oral pallor [No Oral Cyanosis] : no oral cyanosis [Heart Rate And Rhythm] : heart rate and rhythm were normal [Heart Sounds] : normal S1 and S2 [Murmurs] : no murmurs present [Arterial Pulses Normal] : the arterial pulses were normal [Edema] : no peripheral edema present [Respiration, Rhythm And Depth] : normal respiratory rhythm and effort [Exaggerated Use Of Accessory Muscles For Inspiration] : no accessory muscle use [Auscultation Breath Sounds / Voice Sounds] : lungs were clear to auscultation bilaterally [Abdomen Soft] : soft [Abdomen Tenderness] : non-tender [Abdomen Mass (___ Cm)] : no abdominal mass palpated [Nail Clubbing] : no clubbing of the fingernails [Cyanosis, Localized] : no localized cyanosis [Petechial Hemorrhages (___cm)] : no petechial hemorrhages [] : no ischemic changes [Oriented To Time, Place, And Person] : oriented to person, place, and time [Impaired Insight] : insight and judgment were intact [Affect] : the affect was normal [Mood] : the mood was normal [No Anxiety] : not feeling anxious [FreeTextEntry1] : ambulates with walker

## 2019-04-04 NOTE — HISTORY OF PRESENT ILLNESS
[FreeTextEntry1] : Pt is an 83 y/o F who is referred here today by their PCP for evaluation abnormal ECG - NSR with T wave abnormalities.  Pt has PMH DVT 2017 was on Eliquis, HTN, HLD, preDM, peripheral neuropathy.  She states that she feels well overall and has no complaints.  She denies CP, SOB, diaphoresis, palpitations, dizziness, syncope, LE edema, PND.  \par Ambulates with walker\par \par PMH: DVT 2017 was on Eliquis, HTN, HLD, preDM, peripheral neuropathy\par Smoking status: never\par no Etoh or drug use\par Current exercise: none\par Daily water intake:  "not much"\par Daily caffeine intake: 2 cups tea \par OTC medications: none\par Family hx: father CVA 83\par Previous cardiac testing: none\par Previous hospitalizations: diverticulitis s/p resection, hernia - no problems with anesthesia, PNA\par 2 children - no problems with pregnancies

## 2019-04-04 NOTE — REVIEW OF SYSTEMS
[Negative] : Heme/Lymph [see HPI] : see HPI [Shortness Of Breath] : no shortness of breath [Dyspnea on exertion] : not dyspnea during exertion [Chest  Pressure] : no chest pressure [Chest Pain] : no chest pain [Lower Ext Edema] : no extremity edema [Leg Claudication] : no intermittent leg claudication [Palpitations] : no palpitations

## 2019-04-04 NOTE — DISCUSSION/SUMMARY
[FreeTextEntry1] : Pt is an 83 y/o F who is referred here today by their PCP for evaluation abnormal ECG - NSR with T wave abnormalities.  Pt has PMH DVT 2017 was on Eliquis, HTN, HLD, preDM, peripheral neuropathy. \par Will check transthoracic echocardiogram to evaluate left ventricular function and assess for any structural abnormalities\par Given pts risk factors and abnormal ECG will check pharm nuclear stress test to eval for ischemia (pt cannot walk on treadill due to leg pain, ambulates with walker)\par HTN: acceptable, c/w current meds\par HLD: well controlled, c/w statin\par The described plan was discussed with the pt.  All questions and concerns were addressed to the best of my knowledge.

## 2019-04-18 ENCOUNTER — APPOINTMENT (OUTPATIENT)
Dept: ENDOCRINOLOGY | Facility: CLINIC | Age: 84
End: 2019-04-18
Payer: MEDICARE

## 2019-04-18 VITALS
HEIGHT: 62 IN | DIASTOLIC BLOOD PRESSURE: 82 MMHG | WEIGHT: 200 LBS | SYSTOLIC BLOOD PRESSURE: 120 MMHG | BODY MASS INDEX: 36.8 KG/M2 | OXYGEN SATURATION: 97 % | HEART RATE: 76 BPM

## 2019-04-18 DIAGNOSIS — E21.0 PRIMARY HYPERPARATHYROIDISM: ICD-10-CM

## 2019-04-18 PROCEDURE — 99204 OFFICE O/P NEW MOD 45 MIN: CPT

## 2019-04-19 ENCOUNTER — APPOINTMENT (OUTPATIENT)
Dept: CARDIOLOGY | Facility: CLINIC | Age: 84
End: 2019-04-19

## 2019-04-25 ENCOUNTER — APPOINTMENT (OUTPATIENT)
Dept: CARDIOLOGY | Facility: CLINIC | Age: 84
End: 2019-04-25

## 2019-05-13 ENCOUNTER — RX RENEWAL (OUTPATIENT)
Age: 84
End: 2019-05-13

## 2019-05-15 ENCOUNTER — OTHER (OUTPATIENT)
Age: 84
End: 2019-05-15

## 2019-05-20 ENCOUNTER — OTHER (OUTPATIENT)
Age: 84
End: 2019-05-20

## 2019-06-03 ENCOUNTER — RX RENEWAL (OUTPATIENT)
Age: 84
End: 2019-06-03

## 2019-06-11 ENCOUNTER — OTHER (OUTPATIENT)
Age: 84
End: 2019-06-11

## 2019-06-26 ENCOUNTER — OTHER (OUTPATIENT)
Age: 84
End: 2019-06-26

## 2019-07-15 ENCOUNTER — APPOINTMENT (OUTPATIENT)
Dept: RHEUMATOLOGY | Facility: CLINIC | Age: 84
End: 2019-07-15

## 2019-08-02 ENCOUNTER — RX RENEWAL (OUTPATIENT)
Age: 84
End: 2019-08-02

## 2019-08-07 ENCOUNTER — APPOINTMENT (OUTPATIENT)
Dept: ENDOCRINOLOGY | Facility: CLINIC | Age: 84
End: 2019-08-07

## 2019-08-19 ENCOUNTER — RX RENEWAL (OUTPATIENT)
Age: 84
End: 2019-08-19

## 2019-08-23 ENCOUNTER — APPOINTMENT (OUTPATIENT)
Dept: FAMILY MEDICINE | Facility: CLINIC | Age: 84
End: 2019-08-23
Payer: MEDICARE

## 2019-08-23 VITALS
OXYGEN SATURATION: 98 % | BODY MASS INDEX: 39.93 KG/M2 | DIASTOLIC BLOOD PRESSURE: 70 MMHG | SYSTOLIC BLOOD PRESSURE: 128 MMHG | HEART RATE: 73 BPM | HEIGHT: 62 IN | WEIGHT: 217 LBS

## 2019-08-23 DIAGNOSIS — I10 ESSENTIAL (PRIMARY) HYPERTENSION: ICD-10-CM

## 2019-08-23 DIAGNOSIS — E53.8 DEFICIENCY OF OTHER SPECIFIED B GROUP VITAMINS: ICD-10-CM

## 2019-08-23 DIAGNOSIS — Z87.440 PERSONAL HISTORY OF URINARY (TRACT) INFECTIONS: ICD-10-CM

## 2019-08-23 DIAGNOSIS — E55.9 VITAMIN D DEFICIENCY, UNSPECIFIED: ICD-10-CM

## 2019-08-23 DIAGNOSIS — R73.03 PREDIABETES.: ICD-10-CM

## 2019-08-23 DIAGNOSIS — E78.5 HYPERLIPIDEMIA, UNSPECIFIED: ICD-10-CM

## 2019-08-23 DIAGNOSIS — E83.52 HYPERCALCEMIA: ICD-10-CM

## 2019-08-23 PROCEDURE — 36415 COLL VENOUS BLD VENIPUNCTURE: CPT

## 2019-08-23 PROCEDURE — 99214 OFFICE O/P EST MOD 30 MIN: CPT | Mod: 25

## 2019-08-23 PROCEDURE — 81003 URINALYSIS AUTO W/O SCOPE: CPT | Mod: QW

## 2019-08-23 RX ORDER — CHOLECALCIFEROL (VITAMIN D3) 1250 MCG
1.25 MG CAPSULE ORAL
Qty: 12 | Refills: 1 | Status: DISCONTINUED | COMMUNITY
Start: 2019-04-18 | End: 2019-08-23

## 2019-08-23 RX ORDER — NITROFURANTOIN (MONOHYDRATE/MACROCRYSTALS) 25; 75 MG/1; MG/1
100 CAPSULE ORAL TWICE DAILY
Qty: 14 | Refills: 0 | Status: ACTIVE | COMMUNITY
Start: 2019-08-23 | End: 1900-01-01

## 2019-08-26 LAB
25(OH)D3 SERPL-MCNC: 26.9 NG/ML
ALBUMIN SERPL ELPH-MCNC: 4.3 G/DL
ALP BLD-CCNC: 80 U/L
ALT SERPL-CCNC: 18 U/L
ANION GAP SERPL CALC-SCNC: 14 MMOL/L
APPEARANCE: CLEAR
AST SERPL-CCNC: 24 U/L
BACTERIA UR CULT: NORMAL
BACTERIA: NEGATIVE
BASOPHILS # BLD AUTO: 0.05 K/UL
BASOPHILS NFR BLD AUTO: 0.9 %
BILIRUB SERPL-MCNC: 0.5 MG/DL
BILIRUBIN URINE: NEGATIVE
BLOOD URINE: NORMAL
BUN SERPL-MCNC: 14 MG/DL
CALCIUM SERPL-MCNC: 10.5 MG/DL
CHLORIDE SERPL-SCNC: 106 MMOL/L
CHOLEST SERPL-MCNC: 152 MG/DL
CHOLEST/HDLC SERPL: 2.8 RATIO
CO2 SERPL-SCNC: 22 MMOL/L
COLOR: YELLOW
CREAT SERPL-MCNC: 0.72 MG/DL
EOSINOPHIL # BLD AUTO: 0.15 K/UL
EOSINOPHIL NFR BLD AUTO: 2.7 %
ESTIMATED AVERAGE GLUCOSE: 114 MG/DL
FERRITIN SERPL-MCNC: 20 NG/ML
FOLATE SERPL-MCNC: >20 NG/ML
GLUCOSE QUALITATIVE U: NEGATIVE
GLUCOSE SERPL-MCNC: 107 MG/DL
HBA1C MFR BLD HPLC: 5.6 %
HCT VFR BLD CALC: 40.9 %
HDLC SERPL-MCNC: 55 MG/DL
HGB BLD-MCNC: 12.3 G/DL
HYALINE CASTS: 0 /LPF
IMM GRANULOCYTES NFR BLD AUTO: 0.4 %
IRON SATN MFR SERPL: 23 %
IRON SERPL-MCNC: 80 UG/DL
KETONES URINE: NEGATIVE
LDLC SERPL CALC-MCNC: 56 MG/DL
LEUKOCYTE ESTERASE URINE: NEGATIVE
LYMPHOCYTES # BLD AUTO: 2.43 K/UL
LYMPHOCYTES NFR BLD AUTO: 44 %
MAN DIFF?: NORMAL
MCHC RBC-ENTMCNC: 27.8 PG
MCHC RBC-ENTMCNC: 30.1 GM/DL
MCV RBC AUTO: 92.3 FL
MICROSCOPIC-UA: NORMAL
MONOCYTES # BLD AUTO: 0.47 K/UL
MONOCYTES NFR BLD AUTO: 8.5 %
NEUTROPHILS # BLD AUTO: 2.4 K/UL
NEUTROPHILS NFR BLD AUTO: 43.5 %
NITRITE URINE: NEGATIVE
PH URINE: 6
PLATELET # BLD AUTO: 269 K/UL
POTASSIUM SERPL-SCNC: 3.6 MMOL/L
PROT SERPL-MCNC: 7 G/DL
PROTEIN URINE: ABNORMAL
RBC # BLD: 4.43 M/UL
RBC # FLD: 14.5 %
RED BLOOD CELLS URINE: 1 /HPF
SODIUM SERPL-SCNC: 142 MMOL/L
SPECIFIC GRAVITY URINE: 1.02
SQUAMOUS EPITHELIAL CELLS: 1 /HPF
TIBC SERPL-MCNC: 353 UG/DL
TRIGL SERPL-MCNC: 206 MG/DL
TSH SERPL-ACNC: 4.17 UIU/ML
UIBC SERPL-MCNC: 273 UG/DL
UROBILINOGEN URINE: NORMAL
VIT B12 SERPL-MCNC: 336 PG/ML
WBC # FLD AUTO: 5.52 K/UL
WHITE BLOOD CELLS URINE: 1 /HPF

## 2019-08-27 LAB
BILIRUB UR QL STRIP: NEGATIVE
COLLECTION METHOD: NORMAL
GLUCOSE UR-MCNC: NEGATIVE
HCG UR QL: 0.2 EU/DL
HGB UR QL STRIP.AUTO: NORMAL
KETONES UR-MCNC: NEGATIVE
LEUKOCYTE ESTERASE UR QL STRIP: NORMAL
NITRITE UR QL STRIP: NEGATIVE
PH UR STRIP: 5.5
PROT UR STRIP-MCNC: NEGATIVE
SP GR UR STRIP: 1.02

## 2019-11-11 ENCOUNTER — RX RENEWAL (OUTPATIENT)
Age: 84
End: 2019-11-11

## 2019-12-18 ENCOUNTER — RX RENEWAL (OUTPATIENT)
Age: 84
End: 2019-12-18

## 2020-01-07 ENCOUNTER — RX RENEWAL (OUTPATIENT)
Age: 85
End: 2020-01-07

## 2020-01-07 RX ORDER — SIMVASTATIN 20 MG/1
20 TABLET, FILM COATED ORAL
Qty: 30 | Refills: 0 | Status: ACTIVE | COMMUNITY
Start: 2019-06-04 | End: 1900-01-01

## 2020-02-20 ENCOUNTER — RX RENEWAL (OUTPATIENT)
Age: 85
End: 2020-02-20

## 2020-02-21 ENCOUNTER — RX RENEWAL (OUTPATIENT)
Age: 85
End: 2020-02-21

## 2020-03-30 ENCOUNTER — RX RENEWAL (OUTPATIENT)
Age: 85
End: 2020-03-30

## 2020-03-31 ENCOUNTER — RX RENEWAL (OUTPATIENT)
Age: 85
End: 2020-03-31

## 2020-04-15 ENCOUNTER — RX RENEWAL (OUTPATIENT)
Age: 85
End: 2020-04-15

## 2020-05-27 ENCOUNTER — RX RENEWAL (OUTPATIENT)
Age: 85
End: 2020-05-27

## 2020-06-02 ENCOUNTER — RX RENEWAL (OUTPATIENT)
Age: 85
End: 2020-06-02

## 2020-06-28 NOTE — DISCHARGE NOTE ADULT - CARE PROVIDER_API CALL
Juan Riddle), [facility] Medicine  [address]  [place] [address]  Phone: [phone]  Fax: [phone]    Isidro Garcia), EndocrinologyMetabDiabetes; Internal Medicine  [address] [place] [address]  Phone: [phone]  Fax: [phone] Juan Riddle), [facility] Medicine  [address]  [place] [address]  Phone: [phone]  Fax: [phone]    Isidro Garcia), EndocrinologyMetabDiabetes; Internal Medicine  [address] [place] [address]  Phone: [phone]  Fax: [phone]    Minor Kunz; PhD), Neurology  [address] [place] [address]  Phone: [phone]  Fax: [phone] No

## 2020-06-30 ENCOUNTER — RX RENEWAL (OUTPATIENT)
Age: 85
End: 2020-06-30

## 2020-08-07 ENCOUNTER — RX RENEWAL (OUTPATIENT)
Age: 85
End: 2020-08-07

## 2020-09-11 ENCOUNTER — RX RENEWAL (OUTPATIENT)
Age: 85
End: 2020-09-11

## 2020-09-16 ENCOUNTER — RX RENEWAL (OUTPATIENT)
Age: 85
End: 2020-09-16

## 2020-09-17 NOTE — PHYSICAL THERAPY INITIAL EVALUATION ADULT - STANDING BALANCE: STATIC
Medicare Wellness  Personal Prevention Plan of Service     Date of Office Visit:  2020  Encounter Provider:  Demi Ponce MD  Place of Service:  [facility]  Patient Name: Nely Grider  :  1941    As part of the Medicare Wellness portion of your visit today, we are providing you with this personalized preventive plan of services (PPPS). This plan is based upon recommendations of the [place] Preventive Services Task Force (USPSTF) and the Advisory Committee on Immunization Practices (ACIP).    This lists the preventive care services that should be considered, and provides dates of when you are due. Items listed as completed are up-to-date and do not require any further intervention.    Health Maintenance   Topic Date Due   • Pneumococcal Vaccine Once at 65 Years Old  2008   • ZOSTER VACCINE (2 of 3) 10/02/2013   • HEPATITIS C SCREENING  2016   • TDAP/TD VACCINES (2 - Td) 2019   • MEDICARE ANNUAL WELLNESS  2020   • INFLUENZA VACCINE  2020   • LIPID PANEL  10/04/2020   • DIABETIC EYE EXAM  2020   • HEMOGLOBIN A1C  2020   • URINE MICROALBUMIN  2021   • MAMMOGRAM  2022   • COLONOSCOPY  2029       No orders of the defined types were placed in this encounter.      No follow-ups on file.      Obesity, Adult  Obesity is having too much body fat. Being obese means that your weight is more than what is healthy for you.  BMI is a number that explains how much body fat you have. If you have a BMI of 30 or more, you are obese. Obesity is often caused by eating or drinking more calories than your body uses. Changing your lifestyle can help you lose weight.  Obesity can cause serious health problems, such as:  · Stroke.  · Coronary artery disease (CAD).  · Type 2 diabetes.  · Some types of cancer, including cancers of the colon, breast, uterus, and gallbladder.  · Osteoarthritis.  · High  blood pressure (hypertension).  · High cholesterol.  · Sleep apnea.  · Gallbladder stones.  · Infertility problems.  What are the causes?  · Eating meals each day that are high in calories, sugar, and fat.  · Being born with genes that may make you more likely to become obese.  · Having a medical condition that causes obesity.  · Taking certain medicines.  · Sitting a lot (having a sedentary lifestyle).  · Not getting enough sleep.  · Drinking a lot of drinks that have sugar in them.  What increases the risk?  · Having a family history of obesity.  · Being an  woman.  · Being a  man.  · Living in an area with limited access to:  ? Bo, recreation centers, or sidewalks.  ? Healthy food choices, such as grocery stores and farmers' markets.  What are the signs or symptoms?  The main sign is having too much body fat.  How is this treated?  · Treatment for this condition often includes changing your lifestyle. Treatment may include:  ? Changing your diet. This may include making a healthy meal plan.  ? Exercise. This may include activity that causes your heart to beat faster (aerobic exercise) and strength training. Work with your doctor to design a program that works for you.  ? Medicine to help you lose weight. This may be used if you are not able to lose 1 pound a week after 6 weeks of healthy eating and more exercise.  ? Treating conditions that cause the obesity.  ? Surgery. Options may include gastric banding and gastric bypass. This may be done if:  § Other treatments have not helped to improve your condition.  § You have a BMI of 40 or higher.  § You have life-threatening health problems related to obesity.  Follow these instructions at home:  Eating and drinking    · Follow advice from your doctor about what to eat and drink. Your doctor may tell you to:  ? Limit fast food, sweets, and processed snack foods.  ? Choose low-fat options. For example, choose low-fat milk instead of whole  milk.  ? Eat 5 or more servings of fruits or vegetables each day.  ? Eat at home more often. This gives you more control over what you eat.  ? Choose healthy foods when you eat out.  ? Learn to read food labels. This will help you learn how much food is in 1 serving.  ? Keep low-fat snacks available.  ? Avoid drinks that have a lot of sugar in them. These include soda, fruit juice, iced tea with sugar, and flavored milk.  · Drink enough water to keep your pee (urine) pale yellow.  · Do not go on fad diets.  Physical activity  · Exercise often, as told by your doctor. Most adults should get up to 150 minutes of moderate-intensity exercise every week.Ask your doctor:  ? What types of exercise are safe for you.  ? How often you should exercise.  · Warm up and stretch before being active.  · Do slow stretching after being active (cool down).  · Rest between times of being active.  Lifestyle  · Work with your doctor and a food expert (dietitian) to set a weight-loss goal that is best for you.  · Limit your screen time.  · Find ways to reward yourself that do not involve food.  · Do not drink alcohol if:  ? Your doctor tells you not to drink.  ? You are pregnant, may be pregnant, or are planning to become pregnant.  · If you drink alcohol:  ? Limit how much you use to:  § 0-1 drink a day for women.  § 0-2 drinks a day for men.  ? Be aware of how much alcohol is in your drink. In the [place] one drink equals one 12 oz bottle of beer (355 mL), one 5 oz glass of wine (148 mL), or one 1½ oz glass of hard liquor (44 mL).  General instructions  · Keep a weight-loss journal. This can help you keep track of:  ? The food that you eat.  ? How much exercise you get.  · Take over-the-counter and prescription medicines only as told by your doctor.  · Take vitamins and supplements only as told by your doctor.  · Think about joining a support group.  · Keep all follow-up visits as told by your doctor. This is important.  Contact a doctor  if:  · You cannot meet your weight loss goal after you have changed your diet and lifestyle for 6 weeks.  Get help right away if you:  · Are having trouble breathing.  · Are having thoughts of harming yourself.  Summary  · Obesity is having too much body fat.  · Being obese means that your weight is more than what is healthy for you.  · Work with your doctor to set a weight-loss goal.  · Get regular exercise as told by your doctor.  This information is not intended to replace advice given to you by your health care provider. Make sure you discuss any questions you have with your health care provider.  Document Released: 03/11/2013 Document Revised: 08/22/2019 Document Reviewed: 08/22/2019  [employer] Patient Education © 2020 [employer]  Preventive Care 65 Years and Older, Female  Preventive care refers to lifestyle choices and visits with your health care provider that can promote health and wellness. This includes:  · A yearly physical exam. This is also called an annual well check.  · Regular dental and eye exams.  · Immunizations.  · Screening for certain conditions.  · Healthy lifestyle choices, such as diet and exercise.  What can I expect for my preventive care visit?  Physical exam  Your health care provider will check:  · Height and weight. These may be used to calculate body mass index (BMI), which is a measurement that tells if you are at a healthy weight.  · Heart rate and blood pressure.  · Your skin for abnormal spots.  Counseling  Your health care provider may ask you questions about:  · Alcohol, tobacco, and drug use.  · Emotional well-being.  · Home and relationship well-being.  · Sexual activity.  · Eating habits.  · History of falls.  · Memory and ability to understand (cognition).  · Work and work environment.  · Pregnancy and menstrual history.  What immunizations do I need?    Influenza (flu) vaccine  · This is recommended every year.  Tetanus, diphtheria, and pertussis (Tdap) vaccine  · You  may need a Td booster every 10 years.  Varicella (chickenpox) vaccine  · You may need this vaccine if you have not already been vaccinated.  Zoster (shingles) vaccine  · You may need this after age 60.  Pneumococcal conjugate (PCV13) vaccine  · One dose is recommended after age 65.  Pneumococcal polysaccharide (PPSV23) vaccine  · One dose is recommended after age 65.  Measles, mumps, and rubella (MMR) vaccine  · You may need at least one dose of MMR if you were born in 1957 or later. You may also need a second dose.  Meningococcal conjugate (MenACWY) vaccine  · You may need this if you have certain conditions.  Hepatitis A vaccine  · You may need this if you have certain conditions or if you travel or work in places where you may be exposed to hepatitis A.  Hepatitis B vaccine  · You may need this if you have certain conditions or if you travel or work in places where you may be exposed to hepatitis B.  Haemophilus influenzae type b (Hib) vaccine  · You may need this if you have certain conditions.  You may receive vaccines as individual doses or as more than one vaccine together in one shot (combination vaccines). Talk with your health care provider about the risks and benefits of combination vaccines.  What tests do I need?  Blood tests  · Lipid and cholesterol levels. These may be checked every 5 years, or more frequently depending on your overall health.  · Hepatitis C test.  · Hepatitis B test.  Screening  · Lung cancer screening. You may have this screening every year starting at age 55 if you have a 30-pack-year history of smoking and currently smoke or have quit within the past 15 years.  · Colorectal cancer screening. All adults should have this screening starting at age 50 and continuing until age 75. Your health care provider may recommend screening at age 45 if you are at increased risk. You will have tests every 1-10 years, depending on your results and the type of screening test.  · Diabetes screening.  This is done by checking your blood sugar (glucose) after you have not eaten for a while (fasting). You may have this done every 1-3 years.  · Mammogram. This may be done every 1-2 years. Talk with your health care provider about how often you should have regular mammograms.  · BRCA-related cancer screening. This may be done if you have a family history of breast, ovarian, tubal, or peritoneal cancers.  Other tests  · Sexually transmitted disease (STD) testing.  · Bone density scan. This is done to screen for osteoporosis. You may have this done starting at age 65.  Follow these instructions at home:  Eating and drinking  · Eat a diet that includes fresh fruits and vegetables, whole grains, lean protein, and low-fat dairy products. Limit your intake of foods with high amounts of sugar, saturated fats, and salt.  · Take vitamin and mineral supplements as recommended by your health care provider.  · Do not drink alcohol if your health care provider tells you not to drink.  · If you drink alcohol:  ? Limit how much you have to 0-1 drink a day.  ? Be aware of how much alcohol is in your drink. In the [place] one drink equals one 12 oz bottle of beer (355 mL), one 5 oz glass of wine (148 mL), or one 1½ oz glass of hard liquor (44 mL).  Lifestyle  · Take daily care of your teeth and gums.  · Stay active. Exercise for at least 30 minutes on 5 or more days each week.  · Do not use any products that contain nicotine or tobacco, such as cigarettes, e-cigarettes, and chewing tobacco. If you need help quitting, ask your health care provider.  · If you are sexually active, practice safe sex. Use a condom or other form of protection in order to prevent STIs (sexually transmitted infections).  · Talk with your health care provider about taking a low-dose aspirin or statin.  What's next?  · Go to your health care provider once a year for a well check visit.  · Ask your health care provider how often you should have your eyes and  teeth checked.  · Stay up to date on all vaccines.  This information is not intended to replace advice given to you by your health care provider. Make sure you discuss any questions you have with your health care provider.  Document Released: 01/13/2017 Document Revised: 12/12/2019 Document Reviewed: 12/12/2019  [employer] Patient Education © 2020 [employer]     good minus

## 2020-09-22 ENCOUNTER — APPOINTMENT (OUTPATIENT)
Dept: FAMILY MEDICINE | Facility: CLINIC | Age: 85
End: 2020-09-22

## 2020-09-28 ENCOUNTER — RX RENEWAL (OUTPATIENT)
Age: 85
End: 2020-09-28

## 2020-10-10 ENCOUNTER — TRANSCRIPTION ENCOUNTER (OUTPATIENT)
Age: 85
End: 2020-10-10

## 2020-10-12 ENCOUNTER — RX RENEWAL (OUTPATIENT)
Age: 85
End: 2020-10-12

## 2020-10-12 RX ORDER — DULOXETINE HYDROCHLORIDE 30 MG/1
30 CAPSULE, DELAYED RELEASE ORAL
Qty: 30 | Refills: 0 | Status: ACTIVE | COMMUNITY
Start: 2019-02-08 | End: 1900-01-01

## 2020-10-13 ENCOUNTER — RX RENEWAL (OUTPATIENT)
Age: 85
End: 2020-10-13

## 2020-10-13 RX ORDER — LOSARTAN POTASSIUM 100 MG/1
100 TABLET, FILM COATED ORAL
Qty: 30 | Refills: 0 | Status: ACTIVE | COMMUNITY
Start: 2019-05-13 | End: 1900-01-01

## 2020-10-26 ENCOUNTER — RX RENEWAL (OUTPATIENT)
Age: 85
End: 2020-10-26

## 2020-11-20 ENCOUNTER — RX RENEWAL (OUTPATIENT)
Age: 85
End: 2020-11-20

## 2020-11-23 ENCOUNTER — RX RENEWAL (OUTPATIENT)
Age: 85
End: 2020-11-23

## 2020-12-21 PROBLEM — Z87.440 HISTORY OF URINARY TRACT INFECTION: Status: RESOLVED | Noted: 2019-08-23 | Resolved: 2020-12-21

## 2021-11-30 ENCOUNTER — APPOINTMENT (OUTPATIENT)
Dept: FAMILY MEDICINE | Facility: CLINIC | Age: 86
End: 2021-11-30

## 2023-12-28 NOTE — H&P ADULT. - CONSTITUTIONAL DETAILS
I have reviewed and agree with nurse's note and vital signs listed above.    SUBJECTIVE:  Padmaja comes in to recheck her right ear.  She was seen 10 days ago and pt was having plugging in her right ear and whitish moist debris was noted in the canal and the TM could not be visual.  She was started on Cortisporin ear drops and comes in for recheck.  Her ear feels less plugged.  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  It was recommended she use the Triamcinolone and Ketoconazole creams that she has at home, but she has not been doing this. She has just been using the ear drops.    She started to get URI symptoms yesterday of a runny nose, nasal and sinus congestion.   No fever. No cough.  Her symptoms are very mild. She does not want any viral testing today.    Pt brings up that when she is in exercise class and does neck exercises and bends her head forward it feels like her \"windpipe\" is cut off. She can still breath, but feels there is an obstruction. She does not the symptoms at any other time. She had similar symptoms back in 2014. A neck ultrasound was done wich was normal. Pt hasn't felt any lumps or bumps in her neck.    I do not feel any lumps on neck exam today and no thyromegaly. I offered pt to have another neck ultrasound done but she declines as she has had the symptoms for about 9 years and it has not gotten worse. I suspect she is just noticing the symptoms due to the position of the head and neck. She will let me know if the symptoms worsen.     Allergies and medications reviewed.    Current Outpatient Medications   Medication Sig    metoPROLOL succinate (TOPROL-XL) 25 MG 24 hr tablet TAKE ONE TABLET BY MOUTH DAILY    pregabalin (LYRICA) 75 MG capsule TAKE TWO CAPSULES BY MOUTH EVERY MORNING AND TAKE TWO CAPSULES BY MOUTH EVERY EVENING    simvastatin (ZOCOR) 10 MG tablet TAKE ONE TABLET BY MOUTH ONCE NIGHTLY    ketoconazole (NIZORAL) 2 % cream Apply daily on the face or ears    clopidogrel  (PLAVIX) 75 MG tablet Take 1 tablet by mouth daily.    clotrimazole-betamethasone (LOTRISONE) 1-0.05 % cream Apply 1 application topically 2 times daily. For 2 weeks, then as needed.    losartan (COZAAR) 25 MG tablet Take 1 tablet by mouth daily.    Coenzyme Q10-Vitamin E (COQ10 ST-100 PO) Take 1 tablet by mouth daily.    Ascorbic Acid (vitamin C) 500 MG tablet Take 500 mg by mouth daily.    ZINC SULFATE PO Take 25 mg by mouth daily.    Cyanocobalamin (B-12 PO) Take 1 tablet by mouth daily.    alclomethasone (ACLOVATE) 0.05 % ointment Apply to affected areas daily prn.    MULTIVITAMINS PO TABS One tablet daily     No current facility-administered medications for this visit.         OBJECTIVE:  Visit Vitals  BP (!) 156/90   Pulse 78   Temp 97.7 °F (36.5 °C) (Oral)   SpO2 95%     BP by my recheck: 134/80    General: No acute distress, alert, healthy appearing  EARS: Right canal shows less debris and part of the TM that is seen is normal.  Nose: congested  Mouth - no lesions or erythema  Neck: supple with no significant adenopathy, no thyromegaly, no palpable masses  Heart: S1 and S2, regular rhythm, no murmur present  Lungs: clear to auscultation, no wheezing or rhonchi noted  Extremities - Normal, no cyanosis, clubbing, or edema    ASSESSMENT:  Right ear canal debris - improved with Cortisporin ear drops, but still a fair amount of debris still present.  Viral URI - pt declines viral testing today.  Neck concerns see above    PLAN:  See ENT. Can use the Cortisporin ear drops once daily until she sees ENT.  Declines viral lab testing.  See above concerning neck concerns.     well-nourished/well-developed/well-groomed

## 2024-08-06 NOTE — H&P ADULT. - HISTORY OF PRESENT ILLNESS
Fernando Cuevas is a 65 y.o. female on day 3 of admission presenting with Generalized abdominal pain.    Plan: continues treatment with iv abx, awaiting for white count to downtrend. Possible DC tomorrow. Sent more referrals for [facility] to see if there is an agency that will accept with patient's insurance. Will also discuss healthy at home if patient should need it.   Disposition: Home/ Healthy at Home  Barrier: labwork  ADOD:  1-2 days     08/06/24 1433   Discharge Planning   Expected Discharge Disposition  Services       Myrtle Mejia RN       Pt is a 82yo F wiht PMH of alopecia, diverticulosis, htn, hLD, migraines, osteoporosis, neuropathy who was BIBEMS for altered mental status. Pt at home was aphasic.   Daughter in law found mother on bed lying in own urine and vomit. Pt in ED was A&O x2. Pt fell 2 days ago..    Code stroke called. Pt was outside window to have IV tPA    In ED T 97.8, HR 71, /63 RR 18 SpO2 99% on O2 Pt is a 82yo F with PMH of alopecia, htn, hLD, migraines, osteoporosis, neuropathy, DVT on xarelto who was BIBEMS for altered mental status. At baseline, pt ambulates with cane, A&O x3, completes ADLs independently. Pt was last seen at baseline state of health night prior to admission, where she was eating dinner with daughter-in-law, speaking appropriately and mentating well. Pt on day of admission had been found by daughter in law lying horizontally in bed, lying in her own urine and vomit. Pt was found awake, responding incoherently to questions and unable to get up without assistance. She complained of back and shoulder pain. Pt has no hx of incontinence. Currently, pt denies headache, vision changes, cough, sore throat, fever, diarrhea, N/V but endorses lower abdominal pain.     Of note, pt had an unwitnessed fall at home 2 days ago, yelled for help and was found on the ground. At that time, she had complained of hitting her head on a plastic radiator.     In ED T 97.8, HR 71, /63 RR 18 SpO2 99% on O2